# Patient Record
Sex: FEMALE | Race: OTHER | HISPANIC OR LATINO | Employment: UNEMPLOYED | URBAN - METROPOLITAN AREA
[De-identification: names, ages, dates, MRNs, and addresses within clinical notes are randomized per-mention and may not be internally consistent; named-entity substitution may affect disease eponyms.]

---

## 2023-07-12 ENCOUNTER — HOSPITAL ENCOUNTER (EMERGENCY)
Facility: HOSPITAL | Age: 28
Discharge: HOME | End: 2023-07-12
Attending: EMERGENCY MEDICINE

## 2023-07-12 ENCOUNTER — TELEPHONE (OUTPATIENT)
Dept: OBSTETRICS AND GYNECOLOGY | Facility: HOSPITAL | Age: 28
End: 2023-07-12

## 2023-07-12 VITALS
SYSTOLIC BLOOD PRESSURE: 121 MMHG | DIASTOLIC BLOOD PRESSURE: 64 MMHG | TEMPERATURE: 98 F | WEIGHT: 169.75 LBS | RESPIRATION RATE: 19 BRPM | OXYGEN SATURATION: 99 % | HEART RATE: 85 BPM

## 2023-07-12 DIAGNOSIS — N83.201 CYST OF RIGHT OVARY: ICD-10-CM

## 2023-07-12 DIAGNOSIS — R10.31 RIGHT LOWER QUADRANT ABDOMINAL PAIN: ICD-10-CM

## 2023-07-12 DIAGNOSIS — N83.8 OVARIAN MASS: Primary | ICD-10-CM

## 2023-07-12 DIAGNOSIS — N83.209 CYST OF OVARY, UNSPECIFIED LATERALITY: Primary | ICD-10-CM

## 2023-07-12 LAB
ABO + RH BLD: NORMAL
ALBUMIN SERPL BCP-MCNC: 4.4 G/DL (ref 3.5–5.2)
ALP SERPL-CCNC: 81 U/L (ref 55–135)
ALT SERPL W/O P-5'-P-CCNC: 14 U/L (ref 10–44)
ANION GAP SERPL CALC-SCNC: 13 MMOL/L (ref 8–16)
AST SERPL-CCNC: 17 U/L (ref 10–40)
B-HCG UR QL: NEGATIVE
BACTERIA GENITAL QL WET PREP: NORMAL
BASOPHILS # BLD AUTO: 0.1 K/UL (ref 0–0.2)
BASOPHILS NFR BLD: 0.6 % (ref 0–1.9)
BILIRUB SERPL-MCNC: 0.4 MG/DL (ref 0.1–1)
BILIRUB UR QL STRIP: NEGATIVE
BLD GP AB SCN CELLS X3 SERPL QL: NORMAL
BUN SERPL-MCNC: 16 MG/DL (ref 6–20)
CALCIUM SERPL-MCNC: 9.2 MG/DL (ref 8.7–10.5)
CHLORIDE SERPL-SCNC: 107 MMOL/L (ref 95–110)
CLARITY UR: CLEAR
CLUE CELLS VAG QL WET PREP: NORMAL
CO2 SERPL-SCNC: 17 MMOL/L (ref 23–29)
COLOR UR: YELLOW
CREAT SERPL-MCNC: 0.9 MG/DL (ref 0.5–1.4)
CTP QC/QA: YES
DIFFERENTIAL METHOD: ABNORMAL
EOSINOPHIL # BLD AUTO: 0.4 K/UL (ref 0–0.5)
EOSINOPHIL NFR BLD: 2.3 % (ref 0–8)
ERYTHROCYTE [DISTWIDTH] IN BLOOD BY AUTOMATED COUNT: 12.1 % (ref 11.5–14.5)
EST. GFR  (NO RACE VARIABLE): >60 ML/MIN/1.73 M^2
FILAMENT FUNGI VAG WET PREP-#/AREA: NORMAL
GLUCOSE SERPL-MCNC: 127 MG/DL (ref 70–110)
GLUCOSE UR QL STRIP: NEGATIVE
HCT VFR BLD AUTO: 40.1 % (ref 37–48.5)
HGB BLD-MCNC: 13.6 G/DL (ref 12–16)
HGB UR QL STRIP: NEGATIVE
IMM GRANULOCYTES # BLD AUTO: 0.06 K/UL (ref 0–0.04)
IMM GRANULOCYTES NFR BLD AUTO: 0.4 % (ref 0–0.5)
KETONES UR QL STRIP: NEGATIVE
LEUKOCYTE ESTERASE UR QL STRIP: NEGATIVE
LIPASE SERPL-CCNC: 18 U/L (ref 4–60)
LYMPHOCYTES # BLD AUTO: 2.3 K/UL (ref 1–4.8)
LYMPHOCYTES NFR BLD: 14.2 % (ref 18–48)
MCH RBC QN AUTO: 29.2 PG (ref 27–31)
MCHC RBC AUTO-ENTMCNC: 33.9 G/DL (ref 32–36)
MCV RBC AUTO: 86 FL (ref 82–98)
MONOCYTES # BLD AUTO: 0.7 K/UL (ref 0.3–1)
MONOCYTES NFR BLD: 4.3 % (ref 4–15)
NEUTROPHILS # BLD AUTO: 12.4 K/UL (ref 1.8–7.7)
NEUTROPHILS NFR BLD: 78.2 % (ref 38–73)
NITRITE UR QL STRIP: NEGATIVE
NRBC BLD-RTO: 0 /100 WBC
PH UR STRIP: 7 [PH] (ref 5–8)
PLATELET # BLD AUTO: 326 K/UL (ref 150–450)
PMV BLD AUTO: 11.6 FL (ref 9.2–12.9)
POTASSIUM SERPL-SCNC: 3.4 MMOL/L (ref 3.5–5.1)
PROT SERPL-MCNC: 8 G/DL (ref 6–8.4)
PROT UR QL STRIP: NEGATIVE
RBC # BLD AUTO: 4.65 M/UL (ref 4–5.4)
SODIUM SERPL-SCNC: 137 MMOL/L (ref 136–145)
SP GR UR STRIP: 1.02 (ref 1–1.03)
SPECIMEN OUTDATE: NORMAL
SPECIMEN SOURCE: NORMAL
T VAGINALIS GENITAL QL WET PREP: NORMAL
URN SPEC COLLECT METH UR: NORMAL
UROBILINOGEN UR STRIP-ACNC: NEGATIVE EU/DL
WBC # BLD AUTO: 15.9 K/UL (ref 3.9–12.7)
WBC #/AREA VAG WET PREP: NORMAL
YEAST GENITAL QL WET PREP: NORMAL

## 2023-07-12 PROCEDURE — 96361 HYDRATE IV INFUSION ADD-ON: CPT

## 2023-07-12 PROCEDURE — 81003 URINALYSIS AUTO W/O SCOPE: CPT | Performed by: NURSE PRACTITIONER

## 2023-07-12 PROCEDURE — 87210 SMEAR WET MOUNT SALINE/INK: CPT | Performed by: NURSE PRACTITIONER

## 2023-07-12 PROCEDURE — 25000003 PHARM REV CODE 250: Performed by: NURSE PRACTITIONER

## 2023-07-12 PROCEDURE — 99285 EMERGENCY DEPT VISIT HI MDM: CPT | Mod: 25

## 2023-07-12 PROCEDURE — 83690 ASSAY OF LIPASE: CPT | Performed by: NURSE PRACTITIONER

## 2023-07-12 PROCEDURE — 85025 COMPLETE CBC W/AUTO DIFF WBC: CPT | Performed by: NURSE PRACTITIONER

## 2023-07-12 PROCEDURE — 81025 URINE PREGNANCY TEST: CPT | Performed by: NURSE PRACTITIONER

## 2023-07-12 PROCEDURE — 96374 THER/PROPH/DIAG INJ IV PUSH: CPT

## 2023-07-12 PROCEDURE — 86900 BLOOD TYPING SEROLOGIC ABO: CPT | Performed by: OBSTETRICS & GYNECOLOGY

## 2023-07-12 PROCEDURE — 63600175 PHARM REV CODE 636 W HCPCS: Performed by: NURSE PRACTITIONER

## 2023-07-12 PROCEDURE — 96375 TX/PRO/DX INJ NEW DRUG ADDON: CPT

## 2023-07-12 PROCEDURE — 80053 COMPREHEN METABOLIC PANEL: CPT | Performed by: NURSE PRACTITIONER

## 2023-07-12 PROCEDURE — 99214 PR OFFICE/OUTPT VISIT, EST, LEVL IV, 30-39 MIN: ICD-10-PCS | Mod: ,,, | Performed by: OBSTETRICS & GYNECOLOGY

## 2023-07-12 PROCEDURE — 99214 OFFICE O/P EST MOD 30 MIN: CPT | Mod: ,,, | Performed by: OBSTETRICS & GYNECOLOGY

## 2023-07-12 PROCEDURE — 25500020 PHARM REV CODE 255: Performed by: NURSE PRACTITIONER

## 2023-07-12 RX ORDER — OXYCODONE AND ACETAMINOPHEN 5; 325 MG/1; MG/1
1 TABLET ORAL EVERY 4 HOURS PRN
Qty: 5 TABLET | Refills: 0 | Status: SHIPPED | OUTPATIENT
Start: 2023-07-12

## 2023-07-12 RX ORDER — MORPHINE SULFATE 2 MG/ML
2 INJECTION, SOLUTION INTRAMUSCULAR; INTRAVENOUS
Status: DISCONTINUED | OUTPATIENT
Start: 2023-07-12 | End: 2023-07-12 | Stop reason: HOSPADM

## 2023-07-12 RX ORDER — IBUPROFEN 800 MG/1
800 TABLET ORAL EVERY 8 HOURS PRN
Qty: 30 TABLET | Refills: 2 | Status: SHIPPED | OUTPATIENT
Start: 2023-07-12 | End: 2024-07-11

## 2023-07-12 RX ORDER — KETOROLAC TROMETHAMINE 30 MG/ML
15 INJECTION, SOLUTION INTRAMUSCULAR; INTRAVENOUS
Status: COMPLETED | OUTPATIENT
Start: 2023-07-12 | End: 2023-07-12

## 2023-07-12 RX ORDER — ONDANSETRON 2 MG/ML
4 INJECTION INTRAMUSCULAR; INTRAVENOUS
Status: COMPLETED | OUTPATIENT
Start: 2023-07-12 | End: 2023-07-12

## 2023-07-12 RX ADMIN — ONDANSETRON 4 MG: 2 INJECTION INTRAMUSCULAR; INTRAVENOUS at 12:07

## 2023-07-12 RX ADMIN — IOHEXOL 75 ML: 350 INJECTION, SOLUTION INTRAVENOUS at 01:07

## 2023-07-12 RX ADMIN — SODIUM CHLORIDE 1000 ML: 9 INJECTION, SOLUTION INTRAVENOUS at 03:07

## 2023-07-12 RX ADMIN — KETOROLAC TROMETHAMINE 15 MG: 30 INJECTION, SOLUTION INTRAMUSCULAR; INTRAVENOUS at 12:07

## 2023-07-12 RX ADMIN — SODIUM CHLORIDE 1000 ML: 9 INJECTION, SOLUTION INTRAVENOUS at 12:07

## 2023-07-12 NOTE — TELEPHONE ENCOUNTER
Pls set up appt in 1-2 weeks for f/u from ER and please set up US in radiology for follow up cyst

## 2023-07-12 NOTE — ED PROVIDER NOTES
Encounter Date: 7/12/2023       History     Chief Complaint   Patient presents with    Abdominal Pain     RLQ ABD pain that started around 0700. Is experiencing nausea. Denies vomiting a diarrhea. Area is tender to palpation. Pain radiates to the right flank.Skin is pale and diaphoretic.      27 yr old female presents to the ER with reports of right lower abd pain, nausea. No vomiting or diarrhea. Denies vaginal bleeding or dc. Pt states the pain was sudden onset at 7am this morning. No PMH reported.     The history is provided by the patient. The history is limited by a language barrier. A  was used.   Review of patient's allergies indicates:  No Known Allergies  No past medical history on file.  No past surgical history on file.  No family history on file.     Review of Systems   Constitutional:  Positive for diaphoresis.   Gastrointestinal:  Positive for abdominal pain and nausea.   All other systems reviewed and are negative.    Physical Exam     Initial Vitals   BP Pulse Resp Temp SpO2   07/12/23 1224 07/12/23 1219 07/12/23 1219 07/12/23 1219 07/12/23 1219   (!) 93/53 73 20 98 °F (36.7 °C) 98 %      MAP       --                Physical Exam    Constitutional: She appears well-developed and well-nourished. She is diaphoretic. She appears ill.   HENT:   Head: Normocephalic.   Right Ear: Hearing and tympanic membrane normal.   Left Ear: Hearing and tympanic membrane normal.   Nose: Nose normal.   Mouth/Throat: Oropharynx is clear and moist.   Eyes: Lids are normal. Pupils are equal, round, and reactive to light.   Neck:   Normal range of motion.  Cardiovascular:  Normal rate.           Pulmonary/Chest: Breath sounds normal. No respiratory distress. She has no wheezes. She has no rhonchi.   Abdominal: Abdomen is soft. There is abdominal tenderness in the right lower quadrant. There is guarding.   Musculoskeletal:         General: Normal range of motion.      Cervical back: Normal range of  motion. No rigidity.     Neurological: She is alert and oriented to person, place, and time.   Skin: Skin is warm. No rash noted. There is pallor.   Psychiatric: She has a normal mood and affect. Her behavior is normal. Judgment and thought content normal.       ED Course   Critical Care    Date/Time: 7/12/2023 5:47 PM  Performed by: Lydia Sibley NP  Authorized by: lAexis Rosenberg MD   Direct patient critical care time: 40 minutes  Total critical care time (exclusive of procedural time) : 40 minutes  Critical care time was exclusive of separately billable procedures and treating other patients.  Critical care was time spent personally by me on the following activities: blood draw for specimens, discussions with consultants, development of treatment plan with patient or surrogate, examination of patient, obtaining history from patient or surrogate, ordering and performing treatments and interventions, ordering and review of laboratory studies, ordering and review of radiographic studies, pulse oximetry, re-evaluation of patient's condition, review of old charts and evaluation of patient's response to treatment.    Dictation #1  MRN:[id number]  CSN:[id number]   Labs Reviewed   CBC W/ AUTO DIFFERENTIAL - Abnormal; Notable for the following components:       Result Value    WBC 15.90 (*)     Gran # (ANC) 12.4 (*)     Immature Grans (Abs) 0.06 (*)     Gran % 78.2 (*)     Lymph % 14.2 (*)     All other components within normal limits   COMPREHENSIVE METABOLIC PANEL - Abnormal; Notable for the following components:    Potassium 3.4 (*)     CO2 17 (*)     Glucose 127 (*)     All other components within normal limits   C. TRACHOMATIS/N. GONORRHOEAE BY AMP DNA   VAGINAL SCREEN    Narrative:     Release to patient->Immediate   LIPASE   URINALYSIS, REFLEX TO URINE CULTURE    Narrative:     Specimen Source->Urine   POCT URINE PREGNANCY   TYPE & SCREEN          Imaging Results              US Pelvis Comp with Transvag  NON-OB (xpd (Final result)  Result time 07/12/23 17:06:01      Final result by Robbin Obrien MD (07/12/23 17:06:01)                   Impression:      No acute sonographic abnormality.    Right ovarian large, mildly complex cyst correlating with findings on CT abdomen and pelvis earlier same day.  No associated ovarian torsion at this time.      Electronically signed by: Robbin Obrien MD  Date:    07/12/2023  Time:    17:06               Narrative:    EXAMINATION:  US PELVIS COMP WITH TRANSVAG NON-OB (XPD)    CLINICAL HISTORY:  lower back;    TECHNIQUE:  Transabdominal sonography of the pelvis was performed, followed by transvaginal sonography to better evaluate the uterus and ovaries.    COMPARISON:  CT abdomen and pelvis earlier same day    FINDINGS:  Uterus:    Measures 7.1 x 3.6 x 5.2 cm in dimensions.  No discrete uterine fibroids identified.  The endometrium is normal thickness measuring 2 mm.  Small nabothian cyst noted.    Ovaries:    The right ovary measures 6.2 x 4.3 x 6.5 cm.  The left ovary measures 1.5 x 1 x 3.0 cm. Within the right ovary there is a 6.7 x 6 x 6.8 cm well-circumscribed nonvascular hypoechoic mass with multiple low level internal echoes suggestive of a mildly complex cyst.  This correlates with right pelvic area in question on CT abdomen and pelvis earlier same day.  Doppler flow demonstrated to both ovaries.    Free Fluid:    Trace volume nonspecific free fluid in the cul-de-sac, commonly physiologic in this age group.                                       CT Abdomen Pelvis With Contrast (Final result)  Result time 07/12/23 15:19:00      Final result by Robbin Obrien MD (07/12/23 15:19:00)                   Impression:      1. Appendix not identified; however, no right lower quadrant inflammatory change.  2. Right pelvic large cystic mass.  Differential considerations include ovarian versus paraovarian versus mesenteric or peritoneal inclusion cyst.  Given the large size and adnexal  location, this increases risk for right-sided ovarian torsion.  Further evaluation with pelvic ultrasound can be obtained as warranted.  3. Prominent bilateral adnexal and gonadal vessels which can be seen with pelvic congestion syndrome.  4. Mild hepatomegaly.  Questioned nonspecific periportal edema.  Clinical correlation and with LFTs advised.  5. Few additional findings as above.      Electronically signed by: Robbin Obrien MD  Date:    07/12/2023  Time:    15:19               Narrative:    EXAMINATION:  CT ABDOMEN PELVIS WITH CONTRAST    CLINICAL HISTORY:  RLQ abdominal pain (Age >= 14y);    TECHNIQUE:  Low dose axial images, sagittal and coronal reformations were obtained from the lung bases to the pubic symphysis following the IV administration of 75 mL of Omnipaque 350 .  Oral contrast was not given.    COMPARISON:  None.    FINDINGS:  Imaged lung bases show minimal dependent atelectasis on the left.  No consolidation or pleural effusion.  Base of the heart is within normal limits.    Liver measures 18.8 cm in length without focal process seen.  There is questionable nonspecific periportal edema.    Portal vasculature is patent.  Gallbladder, pancreas, spleen, stomach, duodenum and bilateral adrenal glands are within normal limits.  No biliary ductal dilatation.    Bilateral kidneys are normal in size, shape and location with symmetric normal enhancement.  No hydronephrosis or significant perinephric stranding.  Ureters are nondilated.  Urinary bladder is well distended without wall thickening noting mild mass effect upon the its upper aspect from a 6 x 8.7 x 6.8 cm well-circumscribed simple appearing cystic mass within the right hemipelvis extending towards midline.  This appears to arise from the right adnexa.  There is associated additional mild mass effect upon the anterior right aspect of the uterus.  Uterus is anteflexed.  No left adnexal mass.  Prominent bilateral parametrial/gonadal vessels which  remain patent.  Trace volume nonspecific free pelvic fluid, commonly physiologic in this age group.    Appendix not definitively localized; however, no pericecal inflammatory change.  Terminal ileum is within normal limits.  No evidence of bowel obstruction or acute inflammation.  No pneumatosis or portal venous gas.    No ascites, free air or lymphadenopathy.  No significant atherosclerosis.  No aortic aneurysm or dissection.    Tiny fat containing umbilical hernia.    Osseous structures appear intact.                                       Medications   ketorolac injection 15 mg (15 mg Intravenous Given 7/12/23 1248)   sodium chloride 0.9% bolus 1,000 mL 1,000 mL (0 mLs Intravenous Stopped 7/12/23 1346)   ondansetron injection 4 mg (4 mg Intravenous Given 7/12/23 1249)   iohexoL (OMNIPAQUE 350) injection 75 mL (75 mLs Intravenous Given 7/12/23 1348)   sodium chloride 0.9% bolus 1,000 mL 1,000 mL (0 mLs Intravenous Stopped 7/12/23 1657)     Medical Decision Making:   Differential Diagnosis:   Differential Diagnosis includes, but is not limited to:  AAA, aortic dissection, mesenteric ischemia, perforated viscous, MI/ACS, SBO/volvulus, incarcerated/strangulated hernia, intussusception, ileus, appendicitis, cholecystitis, cholangitis, diverticulitis, esophagitis, hepatitis, nephrolithiasis, pancreatitis, gastroenteritis, colitis, IBD/IBS, biliary colic, GERD, PUD, constipation, UTI/pyelonephritis,  disorder.     Clinical Tests:   Lab Tests: Ordered and Reviewed  Radiological Study: Ordered           ED Course as of 07/26/23 0816   Wed Jul 12, 2023   1414 Cbc, chem, ua, upt and lipase reviewed. Elevated WBC noted of 15.90, potassium of 3.4 reviewed.  [DT]   1414 Pt is in CT  [DT]   1529 Impression:     1. Appendix not identified; however, no right lower quadrant inflammatory change.  2. Right pelvic large cystic mass.  Differential considerations include ovarian versus paraovarian versus mesenteric or peritoneal  inclusion cyst.  Given the large size and adnexal location, this increases risk for right-sided ovarian torsion.  Further evaluation with pelvic ultrasound can be obtained as warranted.  3. Prominent bilateral adnexal and gonadal vessels which can be seen with pelvic congestion syndrome.  4. Mild hepatomegaly.  Questioned nonspecific periportal edema.  Clinical correlation and with LFTs advised.  5. Few additional findings as above. [DT]   1537 Spoke with Dr Gracia who will call back as she is in the room with a patient.  [DT]   1544 Spoke with Dr Gracia. Will call with results of ultrasound [DT]   1711 Measures 7.1 x 3.6 x 5.2 cm in dimensions.  No discrete uterine fibroids identified.  The endometrium is normal thickness measuring 2 mm.  Small nabothian cyst noted.     Ovaries:     The right ovary measures 6.2 x 4.3 x 6.5 cm.  The left ovary measures 1.5 x 1 x 3.0 cm. Within the right ovary there is a 6.7 x 6 x 6.8 cm well-circumscribed nonvascular hypoechoic mass with multiple low level internal echoes suggestive of a mildly complex cyst.  This correlates with right pelvic area in question on CT abdomen and pelvis earlier same day.  Doppler flow demonstrated to both ovaries.     Free Fluid:     Trace volume nonspecific free fluid in the cul-de-sac, commonly physiologic in this age group.     Impression:     No acute sonographic abnormality.     Right ovarian large, mildly complex cyst correlating with findings on CT abdomen and pelvis earlier same day.  No associated ovarian torsion at this time.    [DT]   1720 Dr Garcia at the bedside. Offered admission however pt refused. Will have her follow up outpatient and will call in meds to pharmacy.  [DT]   1748 Pt has been given STRICT return precautions. She is not toxic in appearance, much improvement in her condition.  [DT]      ED Course User Index  [DT] Lydia Sibley, JOAN                 Clinical Impression:   Final diagnoses:  [R10.31] Right lower quadrant  abdominal pain  [N83.201] Cyst of right ovary        ED Disposition Condition    Discharge Stable          ED Prescriptions       Medication Sig Dispense Start Date End Date Auth. Provider    ibuprofen (ADVIL,MOTRIN) 800 MG tablet Take 1 tablet (800 mg total) by mouth every 8 (eight) hours as needed for Pain. 30 tablet 7/12/2023 7/11/2024 Brenda Gracia MD    oxyCODONE-acetaminophen (PERCOCET) 5-325 mg per tablet Take 1 tablet by mouth every 4 (four) hours as needed for Pain. 5 tablet 7/12/2023 -- Brenda Gracia MD    ibuprofen (ADVIL,MOTRIN) 800 MG tablet Take 1 tablet (800 mg total) by mouth every 8 (eight) hours as needed for Pain. 30 tablet 7/12/2023 7/11/2024 Brenda Gracia MD    oxyCODONE-acetaminophen (PERCOCET) 5-325 mg per tablet Take 1 tablet by mouth every 4 (four) hours as needed for Pain. 5 tablet 7/12/2023 -- Brenda Gracia MD          Follow-up Information       Follow up With Specialties Details Why Contact Info    Brenda Gracia MD Obstetrics and Gynecology In 2 days  [address]  [place] [address]  [phone]               Lydia Sibley NP  07/12/23 1889       Lydia Sibley NP  07/26/23 6314

## 2023-07-12 NOTE — DISCHARGE INSTRUCTIONS

## 2023-07-12 NOTE — H&P
2023    Chief complaint: pelvic pain     HPI: 27 y.o. yo  who presented to the ER c/o sudden onset right pelvic pain that started this am. She started getting nauseated and sweaty so came to ER. She had a normal BM this am. She just finished her normal period. No vaginal discharge or She has a nexplanon placed 18 months ago so periods are irregular.       GYN history: nl ag/monthly   No history of abn pap, no history of STDs    OB history: g2,     No past medical history on file.  No past surgical history on file.  Social History     Socioeconomic History    Marital status: Single     No family history on file.  Review of patient's allergies indicates:  No Known Allergies  Meds: none     ROS:   See HPI    Temp:  [98 °F (36.7 °C)]   Pulse:  [73-93]   Resp:  [14-20]   BP: ()/(53-61)   SpO2:  [98 %-100 %]     APPEARANCE: Well nourished, well developed, in no acute distress.  Abd: no rebound or guarding    PELVIC:   EXTERNAL GENITALIA/VULVA: No lesions. Normal female genitalia.  URETHRAL MEATUS: Normal size and location, no lesions, no prolapse.  URETHRA: No masses, tenderness, prolapse or scarring.  BLADDER: non-tender, no masses  VAGINA: Moist and well rugated, no discharge, no significant cystocele or rectocele.  CERVIX: No lesions and discharge. No CMT   UTERUS: normal size, regular shape, mobile, non-tender, bladder base nontender.  ADNEXA: fullness in left adnexa, mild tender on exam,   PERINEUM: normal in appearance, no external hemorrhoids     Ovarian cyst     Plan:   Discussed with her choices to monitor, discussed US findings with no signs of torsion  and exam finding are benign, no signs of PID. Gc/ct collected. She would prefer to go home. Discussed strict torsion precautions and when to come back to the ER. Will see in clinic next week and get repeat us as needed or 6-8 week.

## 2023-07-12 NOTE — ED NOTES
Pt appears to be responding more appropriately, pt reporting abd pain is returning with slight nausea. NP informed.

## 2023-07-13 NOTE — TELEPHONE ENCOUNTER
No, she needs to be seen for an appointment in person. I can send a referral for her to go to the [facility] if she would prefer.

## 2023-07-17 ENCOUNTER — TELEPHONE (OUTPATIENT)
Dept: OBSTETRICS AND GYNECOLOGY | Facility: CLINIC | Age: 28
End: 2023-07-17

## 2023-07-21 ENCOUNTER — HOSPITAL ENCOUNTER (OUTPATIENT)
Dept: RADIOLOGY | Facility: HOSPITAL | Age: 28
Discharge: HOME | End: 2023-07-21
Attending: OBSTETRICS & GYNECOLOGY

## 2023-07-21 ENCOUNTER — TELEPHONE (OUTPATIENT)
Dept: OBSTETRICS AND GYNECOLOGY | Facility: HOSPITAL | Age: 28
End: 2023-07-21

## 2023-07-21 DIAGNOSIS — N83.209 CYST OF OVARY, UNSPECIFIED LATERALITY: ICD-10-CM

## 2023-07-21 PROCEDURE — 76856 US EXAM PELVIC COMPLETE: CPT | Mod: 26,,, | Performed by: RADIOLOGY

## 2023-07-21 PROCEDURE — 76856 US EXAM PELVIC COMPLETE: CPT | Mod: TC

## 2023-07-21 PROCEDURE — 76830 US PELVIS COMP WITH TRANSVAG NON-OB (XPD): ICD-10-PCS | Mod: 26,,, | Performed by: RADIOLOGY

## 2023-07-21 PROCEDURE — 76856 US PELVIS COMP WITH TRANSVAG NON-OB (XPD): ICD-10-PCS | Mod: 26,,, | Performed by: RADIOLOGY

## 2023-07-21 PROCEDURE — 76830 TRANSVAGINAL US NON-OB: CPT | Mod: 26,,, | Performed by: RADIOLOGY

## 2023-07-24 ENCOUNTER — TELEPHONE (OUTPATIENT)
Dept: OBSTETRICS AND GYNECOLOGY | Facility: CLINIC | Age: 28
End: 2023-07-24

## 2023-07-24 NOTE — PROGRESS NOTES
She went to the ultrasound but didn't come to her visit. I am worried this cyst may need to be operated on. I would like to see her in clinic but she can't do that then I could refer her to [facility].

## 2023-07-28 ENCOUNTER — OFFICE VISIT (OUTPATIENT)
Dept: OBSTETRICS AND GYNECOLOGY | Facility: CLINIC | Age: 28
End: 2023-07-28

## 2023-07-28 ENCOUNTER — TELEPHONE (OUTPATIENT)
Dept: OBSTETRICS AND GYNECOLOGY | Facility: CLINIC | Age: 28
End: 2023-07-28

## 2023-07-28 VITALS — DIASTOLIC BLOOD PRESSURE: 63 MMHG | WEIGHT: 171.44 LBS | SYSTOLIC BLOOD PRESSURE: 102 MMHG

## 2023-07-28 DIAGNOSIS — N83.201 CYST OF RIGHT OVARY: Primary | ICD-10-CM

## 2023-07-28 DIAGNOSIS — N83.209 CYST OF OVARY, UNSPECIFIED LATERALITY: Primary | ICD-10-CM

## 2023-07-28 PROCEDURE — 99213 OFFICE O/P EST LOW 20 MIN: CPT | Mod: PBBFAC,PO | Performed by: OBSTETRICS & GYNECOLOGY

## 2023-07-28 PROCEDURE — 99999 PR PBB SHADOW E&M-EST. PATIENT-LVL III: ICD-10-PCS | Mod: PBBFAC,,, | Performed by: OBSTETRICS & GYNECOLOGY

## 2023-07-28 PROCEDURE — 99213 PR OFFICE/OUTPT VISIT, EST, LEVL III, 20-29 MIN: ICD-10-PCS | Mod: S$PBB,,, | Performed by: OBSTETRICS & GYNECOLOGY

## 2023-07-28 PROCEDURE — 99999 PR PBB SHADOW E&M-EST. PATIENT-LVL III: CPT | Mod: PBBFAC,,, | Performed by: OBSTETRICS & GYNECOLOGY

## 2023-07-28 PROCEDURE — 99213 OFFICE O/P EST LOW 20 MIN: CPT | Mod: S$PBB,,, | Performed by: OBSTETRICS & GYNECOLOGY

## 2023-07-28 NOTE — PROGRESS NOTES
Chief Complaint   Patient presents with    follow up/ ultrasound        HPI:   Jada Liang 27 y.o.  is here for f/u ER visit. She reports the pain is much improved.       Patient's last menstrual period was 07/14/2023 (exact date).     History reviewed. No pertinent past medical history.    History reviewed. No pertinent surgical history.    History reviewed. No pertinent family history.    Social History     Socioeconomic History    Marital status: Single       OB History    No obstetric history on file.          COMPREHENSIVE GYN HISTORY:  PAP History: Denies abnormal Paps. Reports had one last year that was normal  Infection History: Denies STDs. Denies PID.  Benign History: Denies uterine fibroids. Denies ovarian cysts. Denies endometriosis.   Cancer History: Denies cervical cancer. Denies uterine cancer or hyperplasia. Denies ovarian cancer. Denies vulvar cancer or pre-cancer. Denies vaginal cancer or pre-cancer. Denies breast cancer. Denies colon cancer.  Sexual Activity History:   has no history on file for sexual activity.   Menstrual History: Age of menarche: nl age, unsure exactly when Every 28 days, flows for 3-4 days. mod flow.  Dysmenorrhea History: Reports moderate dysmenorrhea.  Contraception: nexplanon placed 1/2022      ROS:    All other ROS negative     PE:   /63   Wt 77.7 kg (171 lb 6.5 oz)   LMP 07/14/2023 (Exact Date)     APPEARANCE: Well nourished, well developed, in no acute distress.    PELVIC:   EXTERNAL GENITALIA/VULVA: No lesions. Normal female genitalia.  URETHRAL MEATUS: Normal size and location, no lesions, no prolapse.  URETHRA: No masses, tenderness, prolapse or scarring.  BLADDER: non-tender, no masses  VAGINA: Moist and well rugated, no discharge, no significant cystocele or rectocele.  CERVIX: No lesions and discharge.  UTERUS: normal size, regular shape, mobile, non-tender, bladder base nontender.  ADNEXA: fullness in RLQ and tenderness on  palpation  PERINEUM: normal in appearance, no external hemorrhoids     7/21/23 TVUS: Uterus:     Size: 6.5 x 2.7 x 4.0 cm     Masses: None     Endometrium: Normal in this pre menopausal patient, measuring 1 mm.     Right ovary:     Size: 8.5 x 7.7 x 5.6 cm     Appearance: 7.8 x 6.6 x 4.9 cm minimally complex well-circumscribed, nonvascular hypoechoic mass with multiple low-level internal echoes and has had a small interval increase in size (previously 6.7 x 6.0 x 6.8 cm).     Vascular flow: Arterial and venous flow present.     Left ovary:     Size: 3.4 x 2.1 x 1.6 cm     Appearance: Normal with multiple follicles.     Vascular Flow: Arterial and venous flow present.     Free Fluid:     None.     Impression:     Minimally complex left ovarian cyst measuring up to 7.8 cm which could represent an endometrioma or hemorrhagic cyst.  Recommend repeat ultrasound in 6-12 weeks to re-evaluate.     This report was flagged in Epic as abnormal..    7/12/23 TVUS: Uterus:     Measures 7.1 x 3.6 x 5.2 cm in dimensions.  No discrete uterine fibroids identified.  The endometrium is normal thickness measuring 2 mm.  Small nabothian cyst noted.     Ovaries:     The right ovary measures 6.2 x 4.3 x 6.5 cm.  The left ovary measures 1.5 x 1 x 3.0 cm. Within the right ovary there is a 6.7 x 6 x 6.8 cm well-circumscribed nonvascular hypoechoic mass with multiple low level internal echoes suggestive of a mildly complex cyst.  This correlates with right pelvic area in question on CT abdomen and pelvis earlier same day.  Doppler flow demonstrated to both ovaries.     Free Fluid:     Trace volume nonspecific free fluid in the cul-de-sac, commonly physiologic in this age group.     Impression:     No acute sonographic abnormality.     Right ovarian large, mildly complex cyst correlating with findings on CT abdomen and pelvis earlier same day.  No associated ovarian torsion at this time.    1. Cyst of right ovary        Plan:  Discussed ovarian  cysts and that most will resolve even when large but risk of torsion. Discussed can never 100% know what they are/are not cancerous but if enlarging or not resolving then would recommend removal. Discussed choices to remove/monitor now. She would prefer monitoring. Will set up for US in 6 weeks and f/u.        Face to Face time with patient: 20 minutes of total time spent on the encounter, which includes face to face time and non-face to face time preparing to see the patient (eg, review of tests), Obtaining and/or reviewing separately obtained history, Documenting clinical information in the electronic or other health record, Independently interpreting results (not separately reported) and communicating results to the patient/family/caregiver, or Care coordination (not separately reported).

## 2023-08-21 ENCOUNTER — HOSPITAL ENCOUNTER (EMERGENCY)
Facility: HOSPITAL | Age: 28
Discharge: HOME | End: 2023-08-22
Attending: EMERGENCY MEDICINE

## 2023-08-21 DIAGNOSIS — N83.201 RIGHT OVARIAN CYST: Primary | ICD-10-CM

## 2023-08-21 LAB
B-HCG UR QL: NEGATIVE
BILIRUB UR QL STRIP: NEGATIVE
CLARITY UR: CLEAR
COLOR UR: COLORLESS
CTP QC/QA: YES
GLUCOSE UR QL STRIP: NEGATIVE
HGB UR QL STRIP: NEGATIVE
KETONES UR QL STRIP: ABNORMAL
LEUKOCYTE ESTERASE UR QL STRIP: NEGATIVE
NITRITE UR QL STRIP: NEGATIVE
PH UR STRIP: 5 [PH] (ref 5–8)
PROT UR QL STRIP: NEGATIVE
SP GR UR STRIP: 1.01 (ref 1–1.03)
URN SPEC COLLECT METH UR: ABNORMAL
UROBILINOGEN UR STRIP-ACNC: NEGATIVE EU/DL

## 2023-08-21 PROCEDURE — 81003 URINALYSIS AUTO W/O SCOPE: CPT | Performed by: NURSE PRACTITIONER

## 2023-08-21 PROCEDURE — 99285 EMERGENCY DEPT VISIT HI MDM: CPT | Mod: 25

## 2023-08-21 PROCEDURE — 96372 THER/PROPH/DIAG INJ SC/IM: CPT | Performed by: EMERGENCY MEDICINE

## 2023-08-21 PROCEDURE — 63600175 PHARM REV CODE 636 W HCPCS: Performed by: EMERGENCY MEDICINE

## 2023-08-21 PROCEDURE — 81025 URINE PREGNANCY TEST: CPT | Performed by: NURSE PRACTITIONER

## 2023-08-21 RX ORDER — KETOROLAC TROMETHAMINE 30 MG/ML
30 INJECTION, SOLUTION INTRAMUSCULAR; INTRAVENOUS
Status: COMPLETED | OUTPATIENT
Start: 2023-08-21 | End: 2023-08-21

## 2023-08-21 RX ADMIN — KETOROLAC TROMETHAMINE 30 MG: 30 INJECTION, SOLUTION INTRAMUSCULAR; INTRAVENOUS at 07:08

## 2023-08-22 VITALS
OXYGEN SATURATION: 100 % | HEART RATE: 80 BPM | DIASTOLIC BLOOD PRESSURE: 72 MMHG | WEIGHT: 167.44 LBS | TEMPERATURE: 98 F | RESPIRATION RATE: 20 BRPM | SYSTOLIC BLOOD PRESSURE: 136 MMHG

## 2023-08-22 RX ORDER — NAPROXEN 500 MG/1
500 TABLET ORAL 2 TIMES DAILY WITH MEALS
Qty: 10 TABLET | Refills: 0 | Status: SHIPPED | OUTPATIENT
Start: 2023-08-22

## 2023-08-22 RX ORDER — OXYCODONE AND ACETAMINOPHEN 5; 325 MG/1; MG/1
1 TABLET ORAL EVERY 6 HOURS PRN
Qty: 12 TABLET | Refills: 0 | Status: SHIPPED | OUTPATIENT
Start: 2023-08-22

## 2023-08-22 NOTE — ED PROVIDER NOTES
Encounter Date: 8/21/2023       History     Chief Complaint   Patient presents with    Abdominal Pain     Pt reporting abd pain that is localized to the RLQ. Pt was seen in the ED and told to come back if pain returned, this was appx 1 month. Pain began again 1000. +nausea      All information obtained using an independent  through the [employer] network.  This is a 27-year-old female with a history of ovarian cyst who presents with right-sided pelvic pain that started this morning that appears to be improving.  She is also complaining of some nausea and chills.  Denies discharge.      Review of patient's allergies indicates:  No Known Allergies  No past medical history on file.  No past surgical history on file.  No family history on file.     Review of Systems   Genitourinary:  Positive for pelvic pain.       Physical Exam     Initial Vitals [08/21/23 1741]   BP Pulse Resp Temp SpO2   120/63 88 18 98.4 °F (36.9 °C) 99 %      MAP       --         Physical Exam    Vitals reviewed.  Constitutional: She appears well-developed.   Cardiovascular:  Normal rate and regular rhythm.           No murmur heard.  Pulmonary/Chest: Breath sounds normal. She has no wheezes.   Abdominal:   Abdomen is soft, there is tenderness to palpation to the right pelvic area with no rigidity   Musculoskeletal:         General: Normal range of motion.     Neurological: She is alert and oriented to person, place, and time.   Skin: Skin is warm and dry. No rash noted.         ED Course   Procedures  Labs Reviewed   URINALYSIS, REFLEX TO URINE CULTURE - Abnormal; Notable for the following components:       Result Value    Color, UA Colorless (*)     Ketones, UA 1+ (*)     All other components within normal limits    Narrative:     Specimen Source->Urine   POCT URINE PREGNANCY          Imaging Results               US Pelvis Complete Non OB (Final result)  Result time 08/22/23 00:02:49      Final result by Cj Cevallos MD  (08/22/23 00:02:49)                   Impression:      Stable cystic lesion in the right adnexa likely replacing the right ovary.  Correlate for functional cyst, cystadenoma .    Resolved layering internal debris which may represent liquefaction of hemorrhage.    Continued follow-up is advised.    This report was flagged in Epic as abnormal.      Electronically signed by: Cj Cevallos  Date:    08/22/2023  Time:    00:02               Narrative:    EXAMINATION:  Transabdominal and Transvaginal Pelvic Ultrasound.    CLINICAL HISTORY:  right pelvic pain hx of cyst;    TECHNIQUE:  Ultrasound images of the pelvis were obtained.    COMPARISON:  Prior pelvic ultrasound most recent 07/21/2023    FINDINGS:  The uterus is normal in size measuring 7.3 x 4.6 x 3.5 cm. The endometrial stripe is uniform in thickness measuring 2 mm.  No focal uterine lesions identified.    The right ovary is not confidently seen.  A cystic lesion in its place measuring 8.3 x 6.9 x 5.3 cm appears similar to previous exam..   The left ovary measures 3.3 x 2.2 x 1.4 cm with normal blood flow.    There is no free pelvic fluid.                                       Medications   ketorolac injection 30 mg (30 mg Intramuscular Given 8/21/23 1945)     Medical Decision Making  Ovarian cyst is stable in appearance on pelvic ultrasound.  It appears that the patient may be interested in surgical removal of the cyst at this time.  She has good Gynecology follow-up and I instructed her to call her gynecologist and make a recheck appointment to discuss the risks and benefits of surgical removal.  Instructed patient to return immediately for any new or worsening symptoms and she verbalized understanding.    Amount and/or Complexity of Data Reviewed  Labs:  Decision-making details documented in ED Course.  Radiology: ordered.    Risk  Prescription drug management.               ED Course as of 08/22/23 1318   Mon Aug 21, 2023   1951 Urinalysis, Reflex to Urine  Culture Urine, Clean Catch(!)  No UTI [CD]   1951 POCT urine pregnancy  Negative pregnancy [CD]   Tue Aug 22, 2023   0053 Patient is sleeping on re-evaluation, ultrasound shows ovarian cyst that is stable in appearance.  Patient has Gynecology follow-up. [CD]      ED Course User Index  [CD] Lucas Trejo DO                    Clinical Impression:   Final diagnoses:  [N83.201] Right ovarian cyst (Primary)        ED Disposition Condition    Discharge Stable          ED Prescriptions       Medication Sig Dispense Start Date End Date Auth. Provider    oxyCODONE-acetaminophen (PERCOCET) 5-325 mg per tablet Take 1 tablet by mouth every 6 (six) hours as needed for Pain. 12 tablet 8/22/2023 -- Lucas Trejo DO    naproxen (NAPROSYN) 500 MG tablet Take 1 tablet (500 mg total) by mouth 2 (two) times daily with meals. 10 tablet 8/22/2023 -- Lucas Trejo DO          Follow-up Information       Follow up With Specialties Details Why Contact Info    Brenda Gracia MD Obstetrics and Gynecology Schedule an appointment as soon as possible for a visit   [address] [place] [address]  [phone]               Lucas Trejo DO  08/22/23 4847

## 2023-08-22 NOTE — DISCHARGE INSTRUCTIONS
Call Dr Gracia's office later today at 9AM to set up a recheck appointment to discuss surgical removal of your cyst.

## 2023-08-23 ENCOUNTER — TELEPHONE (OUTPATIENT)
Dept: OBSTETRICS AND GYNECOLOGY | Facility: CLINIC | Age: 28
End: 2023-08-23

## 2023-08-23 NOTE — TELEPHONE ENCOUNTER
----- Message from Elizabeth Willis sent at 8/22/2023 10:29 AM CDT -----  Regarding: Call back  Contact: [phone]  Who Called: PT     Type:  Sooner Apoointment Request    Caller is requesting a sooner appointment.  Caller declined first available appointment listed below.  Caller will not accept being placed on the waitlist and is requesting a message be sent to doctor.  Name of Caller: PT   When is the first available appointment? Was asked to send a message   Symptoms: Ovarian cyst   Would the patient rather a call back or a response via [facility]? Call back   Best Call Back Number: [phone]  Additional Information:

## 2023-08-29 ENCOUNTER — TELEPHONE (OUTPATIENT)
Dept: OBSTETRICS AND GYNECOLOGY | Facility: CLINIC | Age: 28
End: 2023-08-29

## 2023-08-29 ENCOUNTER — OFFICE VISIT (OUTPATIENT)
Dept: OBSTETRICS AND GYNECOLOGY | Facility: CLINIC | Age: 28
End: 2023-08-29

## 2023-08-29 VITALS — SYSTOLIC BLOOD PRESSURE: 97 MMHG | DIASTOLIC BLOOD PRESSURE: 67 MMHG | WEIGHT: 169.63 LBS

## 2023-08-29 DIAGNOSIS — N83.201 CYST OF RIGHT OVARY: Primary | ICD-10-CM

## 2023-08-29 PROCEDURE — 99999 PR PBB SHADOW E&M-EST. PATIENT-LVL III: ICD-10-PCS | Mod: PBBFAC,,, | Performed by: OBSTETRICS & GYNECOLOGY

## 2023-08-29 PROCEDURE — 99213 PR OFFICE/OUTPT VISIT, EST, LEVL III, 20-29 MIN: ICD-10-PCS | Mod: S$PBB,,, | Performed by: OBSTETRICS & GYNECOLOGY

## 2023-08-29 PROCEDURE — 99213 OFFICE O/P EST LOW 20 MIN: CPT | Mod: PBBFAC,PO | Performed by: OBSTETRICS & GYNECOLOGY

## 2023-08-29 PROCEDURE — 99213 OFFICE O/P EST LOW 20 MIN: CPT | Mod: S$PBB,,, | Performed by: OBSTETRICS & GYNECOLOGY

## 2023-08-29 PROCEDURE — 99999 PR PBB SHADOW E&M-EST. PATIENT-LVL III: CPT | Mod: PBBFAC,,, | Performed by: OBSTETRICS & GYNECOLOGY

## 2023-08-29 NOTE — TELEPHONE ENCOUNTER
Can you please set her up for a [facility] right ovarian cystectomy and an ultrasound a week before in radiology.     FYI She has no insurance so I gave her the number to central pricing department and faxed a referral to [facility] in case she wanted to go there to have the surgery done.

## 2023-08-29 NOTE — TELEPHONE ENCOUNTER
Can you tell her I called the \[facility]\"" clinic, where they do finical assistance, and they would have an opening 11/21 at 10:00 to see her in the clinic at [address] [place] [address] on 5th floor. They do have a finical assistance program she can call them at [phone] to see if she would qualify.

## 2023-08-29 NOTE — PROGRESS NOTES
Chief Complaint   Patient presents with    Ovarian Cyst       HPI:   Jada Liang 27 y.o.  is here for f/u ER visit for the second time. She reports the pain is much improved and doesn't feel it now.       Patient's last menstrual period was 08/14/2023.     History reviewed. No pertinent past medical history.    History reviewed. No pertinent surgical history.    History reviewed. No pertinent family history.    Social History     Socioeconomic History    Marital status: Single   Tobacco Use    Smoking status: Never    Smokeless tobacco: Never       OB History    No obstetric history on file.          COMPREHENSIVE GYN HISTORY:  PAP History: Denies abnormal Paps. Reports had one last year that was normal  Infection History: Denies STDs. Denies PID.  Benign History: Denies uterine fibroids. Denies ovarian cysts. Denies endometriosis.   Cancer History: Denies cervical cancer. Denies uterine cancer or hyperplasia. Denies ovarian cancer. Denies vulvar cancer or pre-cancer. Denies vaginal cancer or pre-cancer. Denies breast cancer. Denies colon cancer.  Sexual Activity History:   has no history on file for sexual activity.   Menstrual History: Age of menarche: nl age, unsure exactly when Every 28 days, flows for 3-4 days. mod flow.  Dysmenorrhea History: Reports moderate dysmenorrhea.  Contraception: nexplanon placed 1/2022      ROS:    All other ROS negative     PE:   BP 97/67   Wt 76.9 kg (169 lb 10.3 oz)   LMP 08/14/2023     APPEARANCE: Well nourished, well developed, in no acute distress.    PELVIC:   EXTERNAL GENITALIA/VULVA: No lesions. Normal female genitalia.  URETHRAL MEATUS: Normal size and location, no lesions, no prolapse.  URETHRA: No masses, tenderness, prolapse or scarring.  BLADDER: non-tender, no masses  VAGINA: Moist and well rugated, no discharge, no significant cystocele or rectocele.  CERVIX: No lesions and discharge.  UTERUS: normal size, regular shape, mobile, non-tender, bladder  base nontender.  ADNEXA: fullness in RLQ and tenderness on palpation  PERINEUM: normal in appearance, no external hemorrhoids     7/21/23 TVUS: Uterus: Size: 6.5 x 2.7 x 4.0 cm Masses: None   Endometrium: Normal in this pre menopausal patient, measuring 1 m.   Right ovary: Size: 8.5 x 7.7 x 5.6 cm   Appearance: 7.8 x 6.6 x 4.9 cm minimally complex well-circumscribed, nonvascular hypoechoic mass with multiple low-level internal echoes and has had a small interval increase in size (previously 6.7 x 6.0 x 6.8 cm).   Vascular flow: Arterial and venous flow present.   Left ovary:  Size: 3.4 x 2.1 x 1.6 cm Appearance: Normal with multiple follicles. Vascular Flow: Arterial and venous flow present.   Free Fluid: None.   Impression: Minimally complex left ovarian cyst measuring up to 7.8 cm which could represent an endometrioma or hemorrhagic cyst.  Recommend repeat ultrasound in 6-12 weeks to re-evaluate.     This report was flagged in Epic as abnormal..    7/12/23 TVUS: Uterus: Measures 7.1 x 3.6 x 5.2 cm in dimensions.  No discrete uterine fibroids identified.  The endometrium is normal thickness measuring 2 mm.  Small nabothian cyst noted.     Ovaries: The right ovary measures 6.2 x 4.3 x 6.5 cm.  The left ovary measures 1.5 x 1 x 3.0 cm. Within the right ovary there is a 6.7 x 6 x 6.8 cm well-circumscribed nonvascular hypoechoic mass with multiple low level internal echoes suggestive of a mildly complex cyst.  This correlates with right pelvic area in question on CT abdomen and pelvis earlier same day.  Doppler flow demonstrated to both ovaries.     Free Fluid: Trace volume nonspecific free fluid in the cul-de-sac, commonly physiologic in this age group.     Impression:     No acute sonographic abnormality.     Right ovarian large, mildly complex cyst correlating with findings on CT abdomen and pelvis earlier same day.  No associated ovarian torsion at this time.    TVUS 8/21/23: The right ovary is not confidently  seen.  A cystic lesion in its place measuring 8.3 x 6.9 x 5.3 cm appears similar to previous exam..   The left ovary measures 3.3 x 2.2 x 1.4 cm with normal blood flow.    1. Cyst of right ovary          Plan:  Discussed that my concern would be that likely is having times where torsing and then untorsing if having episodes of severe pain then resolves. And doesn't appear to be resolving. I would recommend removal of cyst and possibly ovary. She agrees with surgery. Discussed likely [facility] but may need to open depending. She is self pay and gave number to central pricing office to see cost of surgery and is also going to send a referral to [facility] to see about if they have any finical plan. Will get US before surgery to make sure not resolved.        Face to Face time with patient: 20 minutes of total time spent on the encounter, which includes face to face time and non-face to face time preparing to see the patient (eg, review of tests), Obtaining and/or reviewing separately obtained history, Documenting clinical information in the electronic or other health record, Independently interpreting results (not separately reported) and communicating results to the patient/family/caregiver, or Care coordination (not separately reported).

## 2023-09-11 NOTE — TELEPHONE ENCOUNTER
Alka called pt and informed her of the information the doctor provided about [facility]. Pt has decided to go there and will call to set up the appointment.